# Patient Record
Sex: FEMALE | Race: WHITE | Employment: OTHER | URBAN - METROPOLITAN AREA
[De-identification: names, ages, dates, MRNs, and addresses within clinical notes are randomized per-mention and may not be internally consistent; named-entity substitution may affect disease eponyms.]

---

## 2018-08-10 PROBLEM — I35.0 NONRHEUMATIC AORTIC VALVE STENOSIS: Status: ACTIVE | Noted: 2018-08-10

## 2018-08-10 PROBLEM — I50.32 CHRONIC DIASTOLIC CONGESTIVE HEART FAILURE (HCC): Status: ACTIVE | Noted: 2018-08-10

## 2018-08-10 PROBLEM — I10 HTN (HYPERTENSION), BENIGN: Status: ACTIVE | Noted: 2018-08-10

## 2018-08-10 PROBLEM — E11.51 TYPE 2 DIABETES MELLITUS WITH DIABETIC PERIPHERAL ANGIOPATHY WITHOUT GANGRENE, WITHOUT LONG-TERM CURRENT USE OF INSULIN (HCC): Status: ACTIVE | Noted: 2018-08-10

## 2018-08-10 PROBLEM — E78.2 MIXED HYPERLIPIDEMIA: Status: ACTIVE | Noted: 2018-08-10

## 2018-09-24 ENCOUNTER — HOSPITAL ENCOUNTER (OUTPATIENT)
Dept: CT IMAGING | Facility: HOSPITAL | Age: 83
Discharge: HOME | End: 2018-09-24
Attending: NURSE PRACTITIONER
Payer: MEDICARE

## 2018-09-24 ENCOUNTER — HOSPITAL ENCOUNTER (OUTPATIENT)
Dept: ULTRASOUND IMAGING | Facility: HOSPITAL | Age: 83
Discharge: HOME | End: 2018-09-24
Attending: NURSE PRACTITIONER
Payer: MEDICARE

## 2018-09-24 ENCOUNTER — APPOINTMENT (OUTPATIENT)
Dept: RESPIRATORY THERAPY | Facility: HOSPITAL | Age: 83
End: 2018-09-24
Attending: NURSE PRACTITIONER
Payer: MEDICARE

## 2018-09-24 DIAGNOSIS — I35.0 SEVERE AORTIC STENOSIS: ICD-10-CM

## 2018-09-24 DIAGNOSIS — R09.89 OTHER SPECIFIED SYMPTOMS AND SIGNS INVOLVING THE CIRCULATORY AND RESPIRATORY SYSTEMS: ICD-10-CM

## 2018-09-24 PROCEDURE — 71275 CT ANGIOGRAPHY CHEST: CPT | Performed by: NURSE PRACTITIONER

## 2018-09-24 PROCEDURE — 75635 CT ANGIO ABDOMINAL ARTERIES: CPT | Performed by: NURSE PRACTITIONER

## 2018-09-24 PROCEDURE — 93880 EXTRACRANIAL BILAT STUDY: CPT | Performed by: NURSE PRACTITIONER

## 2018-09-24 PROCEDURE — 99212 OFFICE O/P EST SF 10 MIN: CPT | Performed by: INTERNAL MEDICINE

## 2018-09-24 PROCEDURE — 94618 PULMONARY STRESS TESTING: CPT | Performed by: INTERNAL MEDICINE

## 2018-09-25 NOTE — PROCEDURES
Spirometry should be interpreted with caution since the patient was not able to perform forced maneuvers correctly . ATS reproducibility criteria were not met. This lessens the reliability of the results which reflect the patient's best effort.     Spiromet

## 2018-09-25 NOTE — CONSULTS
Cardiothoracic Surgery  TAVR Consult     TAVR Office Consult  9/24/18  Referring: Dr Fay He  PCP: Dr Elvira Callahan  Reason for consult: severe symptomatic aortic stenosis  80year old with symptomatic aortic stenosis with increased SOB, TOBIAS, and fatigue  PMH: H cardiac cath was reviewed  Labs were reviewed and pre-op labs have been ordered  CXR and CT of chest will be reviewed   Pt was seen and examined by me today and after reviewing the chart and talking with the patient   the assessment after discussion with MARY

## 2018-10-15 ENCOUNTER — APPOINTMENT (OUTPATIENT)
Dept: GENERAL RADIOLOGY | Facility: HOSPITAL | Age: 83
DRG: 266 | End: 2018-10-15
Attending: NURSE PRACTITIONER
Payer: MEDICARE

## 2018-10-15 ENCOUNTER — HOSPITAL ENCOUNTER (INPATIENT)
Facility: HOSPITAL | Age: 83
LOS: 2 days | Discharge: HOME | DRG: 266 | End: 2018-10-17
Attending: INTERNAL MEDICINE | Admitting: INTERNAL MEDICINE
Payer: MEDICARE

## 2018-10-15 ENCOUNTER — ANESTHESIA EVENT (OUTPATIENT)
Dept: CARDIAC SURGERY | Facility: HOSPITAL | Age: 83
End: 2018-10-15

## 2018-10-15 PROCEDURE — 86920 COMPATIBILITY TEST SPIN: CPT

## 2018-10-15 PROCEDURE — 82962 GLUCOSE BLOOD TEST: CPT

## 2018-10-15 PROCEDURE — 85025 COMPLETE CBC W/AUTO DIFF WBC: CPT | Performed by: NURSE PRACTITIONER

## 2018-10-15 PROCEDURE — 85610 PROTHROMBIN TIME: CPT | Performed by: NURSE PRACTITIONER

## 2018-10-15 PROCEDURE — 76937 US GUIDE VASCULAR ACCESS: CPT

## 2018-10-15 PROCEDURE — 83036 HEMOGLOBIN GLYCOSYLATED A1C: CPT | Performed by: PHYSICIAN ASSISTANT

## 2018-10-15 PROCEDURE — 86901 BLOOD TYPING SEROLOGIC RH(D): CPT | Performed by: NURSE PRACTITIONER

## 2018-10-15 PROCEDURE — 80053 COMPREHEN METABOLIC PANEL: CPT | Performed by: PHYSICIAN ASSISTANT

## 2018-10-15 PROCEDURE — 36592 COLLECT BLOOD FROM PICC: CPT

## 2018-10-15 PROCEDURE — 86850 RBC ANTIBODY SCREEN: CPT | Performed by: NURSE PRACTITIONER

## 2018-10-15 PROCEDURE — 85027 COMPLETE CBC AUTOMATED: CPT | Performed by: PHYSICIAN ASSISTANT

## 2018-10-15 PROCEDURE — 71045 X-RAY EXAM CHEST 1 VIEW: CPT | Performed by: NURSE PRACTITIONER

## 2018-10-15 PROCEDURE — 83880 ASSAY OF NATRIURETIC PEPTIDE: CPT | Performed by: NURSE PRACTITIONER

## 2018-10-15 PROCEDURE — 87081 CULTURE SCREEN ONLY: CPT | Performed by: NURSE PRACTITIONER

## 2018-10-15 PROCEDURE — 86900 BLOOD TYPING SEROLOGIC ABO: CPT | Performed by: NURSE PRACTITIONER

## 2018-10-15 RX ORDER — ALBUTEROL SULFATE 90 UG/1
2 AEROSOL, METERED RESPIRATORY (INHALATION) EVERY 6 HOURS PRN
Status: DISCONTINUED | OUTPATIENT
Start: 2018-10-15 | End: 2018-10-17

## 2018-10-15 RX ORDER — ONDANSETRON 2 MG/ML
4 INJECTION INTRAMUSCULAR; INTRAVENOUS EVERY 6 HOURS PRN
Status: DISCONTINUED | OUTPATIENT
Start: 2018-10-15 | End: 2018-10-17

## 2018-10-15 RX ORDER — ONDANSETRON 4 MG/1
4 TABLET, ORALLY DISINTEGRATING ORAL EVERY 6 HOURS PRN
Status: DISCONTINUED | OUTPATIENT
Start: 2018-10-15 | End: 2018-10-17

## 2018-10-15 RX ORDER — SODIUM CHLORIDE 9 MG/ML
INJECTION, SOLUTION INTRAVENOUS CONTINUOUS
Status: DISCONTINUED | OUTPATIENT
Start: 2018-10-16 | End: 2018-10-17

## 2018-10-15 RX ORDER — CHLORHEXIDINE GLUCONATE 4 G/100ML
30 SOLUTION TOPICAL ONCE
Status: COMPLETED | OUTPATIENT
Start: 2018-10-15 | End: 2018-10-15

## 2018-10-15 RX ORDER — ASPIRIN 81 MG/1
81 TABLET, CHEWABLE ORAL DAILY
Status: DISCONTINUED | OUTPATIENT
Start: 2018-10-15 | End: 2018-10-17

## 2018-10-15 RX ORDER — ROSUVASTATIN CALCIUM 5 MG/1
5 TABLET, COATED ORAL NIGHTLY
Status: DISCONTINUED | OUTPATIENT
Start: 2018-10-15 | End: 2018-10-17

## 2018-10-15 RX ORDER — DILTIAZEM HYDROCHLORIDE 240 MG/1
240 CAPSULE, COATED, EXTENDED RELEASE ORAL DAILY
Status: DISCONTINUED | OUTPATIENT
Start: 2018-10-15 | End: 2018-10-17

## 2018-10-15 NOTE — H&P
[facility] Hospitalist History and Physical      CC: direct admission for TAVR     PCP: Jose Knott MD      History of Present Illness: Patient is a 80year old female with PMH sig for severe AS, chronic diastolic heart failure, DM, HL, HTN who presented to [address] [place] [address] LABS:   Lab Results   Component Value Date    WBC 7.9 10/15/2018    WBC 7.9 10/15/2018    HGB 10.6 10/15/2018    HGB 10.6 10/15/2018    HCT 32.7 10/15/2018    HCT 32.7 10/15/2018    .0 10/15/2018    .0 10/15/2018    CREATSERUM 0.98 10/15/20

## 2018-10-15 NOTE — ANESTHESIA PREPROCEDURE EVALUATION
PRE-OP EVALUATION    Patient Name: Manny Rosa    Pre-op Diagnosis: aortic stenosis    Procedure(s):  transcatheter aortic valve replacement, 23mm Melissa, right femoral approach    Surgeon(s) and Role:     * Darinel Ibrahim MD - Primary    Pre-op vitals Cardiovascular                  (+) hypertension             (+) valvular problems/murmurs and AS       (+) CHF                Endo/Other      (+) diabetes                            Pulmonary        (+) COPD                   Neuro/Psych 32.4 10/15/2018    MCHC 32.4 10/15/2018    MCHC 31.3 09/18/2018    RDW 14.2 10/15/2018    RDW 14.2 10/15/2018    RDW 14.6 09/18/2018    .0 10/15/2018    .0 10/15/2018     09/18/2018     Lab Results   Component Value Date     10/1

## 2018-10-15 NOTE — OPERATIVE REPORT
Cardiovascular Surgery Operative Note  TAVR        INDICATIONS:         80year old with symptomatic aortic stenosis  Pt seen in TAVR clinic with Tenzin Campoverde and Allan  STS Risk Score: 6.9 %     The assessment after discussion with Dr Jing Campoverde is that the

## 2018-10-15 NOTE — PROGRESS NOTES
[address] [facility] Cardiology  Progress Note    Curlene Michael Patient Status:  Inpatient    1925 MRN [id number]   [facility] 2NE-A Attending Arthur Koyanagi, MD   Hosp Day # 0 PCP Eber Bear MD     The patient was seen and examined. and daughter-in-law since being discharged in July 2018.       Uses a walker at home.      The patient is tolerating medications with no reported side effects.        HISTORY:    Past Medical History   Past Medical History:  No date:  Aortic stenosis      C and respiratory systems as above. No nausea/vomiting or significant abdominal complaints. No neurological complaints. No fevers or chills.  A ten point review of systems has been performed and is otherwise negative, except as described above.     PHYSICAL EX doppler. Dr. Nneka Martin     CATH 8/29/18 [address]:  Hemodynamics:   RA: peak of 2, RV peak of 47. PA pressure 52/17 w/ mean of 30. PCWP wa na A wave of 21, C of 22 , mean of 16. Calculated pO2 sat on room air 58%.   Calculated aorta sat Spirometry  · Discussed TAVR procedure at length--over 30 minutes of education  · We discussed the  benefits and risks including heart attack, stroke, open heart surgery, need for a pacemaker, bleeding, and death.  All questions answered.      Chronic diast

## 2018-10-16 ENCOUNTER — APPOINTMENT (OUTPATIENT)
Dept: CV DIAGNOSTICS | Facility: HOSPITAL | Age: 83
DRG: 266 | End: 2018-10-16
Attending: INTERNAL MEDICINE
Payer: MEDICARE

## 2018-10-16 ENCOUNTER — ANESTHESIA (OUTPATIENT)
Dept: CARDIAC SURGERY | Facility: HOSPITAL | Age: 83
End: 2018-10-16

## 2018-10-16 ENCOUNTER — APPOINTMENT (OUTPATIENT)
Dept: GENERAL RADIOLOGY | Facility: HOSPITAL | Age: 83
DRG: 266 | End: 2018-10-16
Attending: INTERNAL MEDICINE
Payer: MEDICARE

## 2018-10-16 ENCOUNTER — TELEPHONE (OUTPATIENT)
Dept: INTERVENTIONAL RADIOLOGY/VASCULAR | Facility: HOSPITAL | Age: 83
End: 2018-10-16

## 2018-10-16 ENCOUNTER — APPOINTMENT (OUTPATIENT)
Dept: CV DIAGNOSTICS | Facility: HOSPITAL | Age: 83
DRG: 266 | End: 2018-10-16
Attending: NURSE PRACTITIONER
Payer: MEDICARE

## 2018-10-16 PROCEDURE — 82962 GLUCOSE BLOOD TEST: CPT

## 2018-10-16 PROCEDURE — 85014 HEMATOCRIT: CPT

## 2018-10-16 PROCEDURE — 85347 COAGULATION TIME ACTIVATED: CPT

## 2018-10-16 PROCEDURE — 82330 ASSAY OF CALCIUM: CPT

## 2018-10-16 PROCEDURE — 93010 ELECTROCARDIOGRAM REPORT: CPT | Performed by: INTERNAL MEDICINE

## 2018-10-16 PROCEDURE — 0JH606Z INSERTION OF PACEMAKER, DUAL CHAMBER INTO CHEST SUBCUTANEOUS TISSUE AND FASCIA, OPEN APPROACH: ICD-10-PCS | Performed by: INTERNAL MEDICINE

## 2018-10-16 PROCEDURE — 80048 BASIC METABOLIC PNL TOTAL CA: CPT | Performed by: PHYSICIAN ASSISTANT

## 2018-10-16 PROCEDURE — B517YZA FLUOROSCOPY OF LEFT SUBCLAVIAN VEIN USING OTHER CONTRAST, GUIDANCE: ICD-10-PCS | Performed by: INTERNAL MEDICINE

## 2018-10-16 PROCEDURE — 85027 COMPLETE CBC AUTOMATED: CPT | Performed by: PHYSICIAN ASSISTANT

## 2018-10-16 PROCEDURE — 93355 ECHO TRANSESOPHAGEAL (TEE): CPT | Performed by: NURSE PRACTITIONER

## 2018-10-16 PROCEDURE — 93306 TTE W/DOPPLER COMPLETE: CPT | Performed by: INTERNAL MEDICINE

## 2018-10-16 PROCEDURE — B310YZZ FLUOROSCOPY OF THORACIC AORTA USING OTHER CONTRAST: ICD-10-PCS | Performed by: INTERNAL MEDICINE

## 2018-10-16 PROCEDURE — B24BZZ4 ULTRASONOGRAPHY OF HEART WITH AORTA, TRANSESOPHAGEAL: ICD-10-PCS | Performed by: INTERNAL MEDICINE

## 2018-10-16 PROCEDURE — 85027 COMPLETE CBC AUTOMATED: CPT | Performed by: NURSE PRACTITIONER

## 2018-10-16 PROCEDURE — 93005 ELECTROCARDIOGRAM TRACING: CPT

## 2018-10-16 PROCEDURE — 02HK3JZ INSERTION OF PACEMAKER LEAD INTO RIGHT VENTRICLE, PERCUTANEOUS APPROACH: ICD-10-PCS | Performed by: INTERNAL MEDICINE

## 2018-10-16 PROCEDURE — 82803 BLOOD GASES ANY COMBINATION: CPT

## 2018-10-16 PROCEDURE — 84295 ASSAY OF SERUM SODIUM: CPT

## 2018-10-16 PROCEDURE — 02H63JZ INSERTION OF PACEMAKER LEAD INTO RIGHT ATRIUM, PERCUTANEOUS APPROACH: ICD-10-PCS | Performed by: INTERNAL MEDICINE

## 2018-10-16 PROCEDURE — 84132 ASSAY OF SERUM POTASSIUM: CPT

## 2018-10-16 PROCEDURE — 02RF38Z REPLACEMENT OF AORTIC VALVE WITH ZOOPLASTIC TISSUE, PERCUTANEOUS APPROACH: ICD-10-PCS | Performed by: INTERNAL MEDICINE

## 2018-10-16 PROCEDURE — 80053 COMPREHEN METABOLIC PANEL: CPT | Performed by: PHYSICIAN ASSISTANT

## 2018-10-16 PROCEDURE — 85610 PROTHROMBIN TIME: CPT | Performed by: NURSE PRACTITIONER

## 2018-10-16 PROCEDURE — 36592 COLLECT BLOOD FROM PICC: CPT

## 2018-10-16 PROCEDURE — 71045 X-RAY EXAM CHEST 1 VIEW: CPT | Performed by: INTERNAL MEDICINE

## 2018-10-16 DEVICE — VALVE SAPIEN 23MM COMMANDER: Type: IMPLANTABLE DEVICE | Site: HEART | Status: FUNCTIONAL

## 2018-10-16 RX ORDER — LACTULOSE 20 G/30ML
20 SOLUTION ORAL DAILY PRN
Status: DISCONTINUED | OUTPATIENT
Start: 2018-10-16 | End: 2018-10-17

## 2018-10-16 RX ORDER — DOCUSATE SODIUM 100 MG/1
100 CAPSULE, LIQUID FILLED ORAL 2 TIMES DAILY
Status: DISCONTINUED | OUTPATIENT
Start: 2018-10-16 | End: 2018-10-17

## 2018-10-16 RX ORDER — CEFAZOLIN SODIUM/WATER 2 G/20 ML
2 SYRINGE (ML) INTRAVENOUS EVERY 12 HOURS
Status: COMPLETED | OUTPATIENT
Start: 2018-10-16 | End: 2018-10-17

## 2018-10-16 RX ORDER — HYDROCODONE BITARTRATE AND ACETAMINOPHEN 10; 325 MG/1; MG/1
2 TABLET ORAL EVERY 4 HOURS PRN
Status: DISCONTINUED | OUTPATIENT
Start: 2018-10-16 | End: 2018-10-17

## 2018-10-16 RX ORDER — MORPHINE SULFATE 4 MG/ML
8 INJECTION, SOLUTION INTRAMUSCULAR; INTRAVENOUS
Status: DISCONTINUED | OUTPATIENT
Start: 2018-10-16 | End: 2018-10-17

## 2018-10-16 RX ORDER — POLYETHYLENE GLYCOL 3350 17 G/17G
1 POWDER, FOR SOLUTION ORAL DAILY PRN
Status: DISCONTINUED | OUTPATIENT
Start: 2018-10-16 | End: 2018-10-17

## 2018-10-16 RX ORDER — ONDANSETRON 2 MG/ML
4 INJECTION INTRAMUSCULAR; INTRAVENOUS EVERY 6 HOURS PRN
Status: DISCONTINUED | OUTPATIENT
Start: 2018-10-16 | End: 2018-10-17

## 2018-10-16 RX ORDER — FAMOTIDINE 20 MG/1
20 TABLET ORAL EVERY 24 HOURS
Status: DISCONTINUED | OUTPATIENT
Start: 2018-10-16 | End: 2018-10-17

## 2018-10-16 RX ORDER — DOBUTAMINE HYDROCHLORIDE 200 MG/100ML
INJECTION INTRAVENOUS CONTINUOUS PRN
Status: DISCONTINUED | OUTPATIENT
Start: 2018-10-16 | End: 2018-10-17

## 2018-10-16 RX ORDER — FAMOTIDINE 20 MG/1
20 TABLET ORAL 2 TIMES DAILY
Status: DISCONTINUED | OUTPATIENT
Start: 2018-10-16 | End: 2018-10-16

## 2018-10-16 RX ORDER — MORPHINE SULFATE 4 MG/ML
2 INJECTION, SOLUTION INTRAMUSCULAR; INTRAVENOUS
Status: DISCONTINUED | OUTPATIENT
Start: 2018-10-16 | End: 2018-10-17

## 2018-10-16 RX ORDER — ACETAMINOPHEN 325 MG/1
650 TABLET ORAL EVERY 4 HOURS PRN
Status: DISCONTINUED | OUTPATIENT
Start: 2018-10-16 | End: 2018-10-17

## 2018-10-16 RX ORDER — BISACODYL 10 MG
10 SUPPOSITORY, RECTAL RECTAL
Status: DISCONTINUED | OUTPATIENT
Start: 2018-10-16 | End: 2018-10-17

## 2018-10-16 RX ORDER — ALBUMIN, HUMAN INJ 5% 5 %
250 SOLUTION INTRAVENOUS ONCE AS NEEDED
Status: ACTIVE | OUTPATIENT
Start: 2018-10-16 | End: 2018-10-16

## 2018-10-16 RX ORDER — HYDRALAZINE HYDROCHLORIDE 20 MG/ML
10 INJECTION INTRAMUSCULAR; INTRAVENOUS
Status: DISCONTINUED | OUTPATIENT
Start: 2018-10-16 | End: 2018-10-17

## 2018-10-16 RX ORDER — CEFAZOLIN SODIUM/WATER 2 G/20 ML
SYRINGE (ML) INTRAVENOUS
Status: DISCONTINUED | OUTPATIENT
Start: 2018-10-16 | End: 2018-10-16 | Stop reason: HOSPADM

## 2018-10-16 RX ORDER — NITROGLYCERIN 20 MG/100ML
INJECTION INTRAVENOUS CONTINUOUS PRN
Status: DISCONTINUED | OUTPATIENT
Start: 2018-10-16 | End: 2018-10-17

## 2018-10-16 RX ORDER — HYDROCODONE BITARTRATE AND ACETAMINOPHEN 10; 325 MG/1; MG/1
1 TABLET ORAL EVERY 4 HOURS PRN
Status: DISCONTINUED | OUTPATIENT
Start: 2018-10-16 | End: 2018-10-17

## 2018-10-16 RX ORDER — FAMOTIDINE 10 MG/ML
20 INJECTION, SOLUTION INTRAVENOUS EVERY 24 HOURS
Status: DISCONTINUED | OUTPATIENT
Start: 2018-10-16 | End: 2018-10-17

## 2018-10-16 RX ORDER — MORPHINE SULFATE 4 MG/ML
4 INJECTION, SOLUTION INTRAMUSCULAR; INTRAVENOUS
Status: DISCONTINUED | OUTPATIENT
Start: 2018-10-16 | End: 2018-10-17

## 2018-10-16 RX ORDER — SODIUM CHLORIDE 9 MG/ML
INJECTION, SOLUTION INTRAVENOUS CONTINUOUS
Status: DISCONTINUED | OUTPATIENT
Start: 2018-10-16 | End: 2018-10-17

## 2018-10-16 RX ORDER — FAMOTIDINE 10 MG/ML
20 INJECTION, SOLUTION INTRAVENOUS 2 TIMES DAILY
Status: DISCONTINUED | OUTPATIENT
Start: 2018-10-16 | End: 2018-10-16

## 2018-10-16 NOTE — PROCEDURES
PROCEDURE PERFORMED: Transcatheter aortic valve replacement, 23 mm Leyva Melissa 3 aortic valve, right  transfemoral approach percutaneously. OPERATORS:   1. Cardiovascular surgeon, Dr. Sheri Mckeon.   2. Interventional Cardiology, Dr. Caitlin Wolff

## 2018-10-16 NOTE — PLAN OF CARE
SCr 0.93 mg/dL; estimated CrCl 27.1 mL/min. Post-op Ancef adjusted to 2 grams IV every 12 hours x 2 doses per protocol.     Yaneth Herman, PharmD  10/16/18 12:29 PM

## 2018-10-16 NOTE — ANESTHESIA POSTPROCEDURE EVALUATION
[facility] Patient Status:  Inpatient   Age/Gender 80year old female MRN [id number]   [facility] 6NE-A Attending Magdalena Kaiser MD   Hosp Day # 1 PCP Marlene Arriola MD       Anesthesia Post-op Note    Procedure(s):  trans

## 2018-10-16 NOTE — PROGRESS NOTES
[facility] Hospitalist Progress Note                                                                   Alia Goodson [address]  7/30/1925    CC: f/u severe AS s/p TAVR    SUBJECTIVE:    Pt seen in CCU post • Nitroglycerin in D5W     • nitroprusside (NIPRIDE) 50 mg in D5W infusion     • sodium chloride 50 mL/hr at 10/16/18 0200     PRN: HYDROcodone-acetaminophen **OR** HYDROcodone-acetaminophen, Albumin Human, DOBUTamine, norepinephrine, EPINEPHrine (ADRE

## 2018-10-16 NOTE — PROCEDURES
[facility] [address] [place] [address] Cardiology  Transesophageal Echocardiogram Report    PREOPERATIVE DIAGNOSIS:   Severe aortic stenosis    POSTOPERATIVE DIAGNOSIS:  Transcatheter aortic valve replacement    PROCEDURE PERFORMED: Transesophageal echocardiogram with Doppl gradient 38 mmHg. Postprocedure, normally functioning Melissa 3 bioprosthetic valve with a mean gradient of 3 mmHg. Trace paravalvular aortic regurgitation (by VARC-2 criteria) was noted.     Clemencia Ryder MD, [facility]  10/16/2018  12:16 PM

## 2018-10-16 NOTE — PROGRESS NOTES
[facility] Pharm[facility] Note: Renal dose adjustment for Famotidine (Pepcid)  Osito Rousseau has been prescribed Famotidine (Pepcid) 20 mg every 12 hours. Estimated Creatinine Clearance: 27.1 mL/min (based on SCr of 0.93 mg/dL).     Her calculated creatinine

## 2018-10-17 ENCOUNTER — APPOINTMENT (OUTPATIENT)
Dept: GENERAL RADIOLOGY | Facility: HOSPITAL | Age: 83
DRG: 266 | End: 2018-10-17
Attending: NURSE PRACTITIONER
Payer: MEDICARE

## 2018-10-17 VITALS
SYSTOLIC BLOOD PRESSURE: 124 MMHG | DIASTOLIC BLOOD PRESSURE: 48 MMHG | HEART RATE: 70 BPM | BODY MASS INDEX: 28 KG/M2 | OXYGEN SATURATION: 97 % | RESPIRATION RATE: 22 BRPM | TEMPERATURE: 97 F | WEIGHT: 142.44 LBS

## 2018-10-17 PROBLEM — Z95.0 PACEMAKER: Chronic | Status: ACTIVE | Noted: 2018-10-16

## 2018-10-17 PROBLEM — Z95.2 S/P TAVR (TRANSCATHETER AORTIC VALVE REPLACEMENT): Status: ACTIVE | Noted: 2018-10-17

## 2018-10-17 PROCEDURE — 85027 COMPLETE CBC AUTOMATED: CPT | Performed by: NURSE PRACTITIONER

## 2018-10-17 PROCEDURE — 93010 ELECTROCARDIOGRAM REPORT: CPT | Performed by: INTERNAL MEDICINE

## 2018-10-17 PROCEDURE — 80053 COMPREHEN METABOLIC PANEL: CPT | Performed by: NURSE PRACTITIONER

## 2018-10-17 PROCEDURE — 93005 ELECTROCARDIOGRAM TRACING: CPT

## 2018-10-17 PROCEDURE — 71046 X-RAY EXAM CHEST 2 VIEWS: CPT | Performed by: NURSE PRACTITIONER

## 2018-10-17 PROCEDURE — 82962 GLUCOSE BLOOD TEST: CPT

## 2018-10-17 PROCEDURE — 85610 PROTHROMBIN TIME: CPT | Performed by: NURSE PRACTITIONER

## 2018-10-17 RX ORDER — AMOXICILLIN 500 MG/1
2000 CAPSULE ORAL
Qty: 4 CAPSULE | Refills: 4 | Status: SHIPPED | OUTPATIENT
Start: 2018-10-17 | End: 2018-10-27

## 2018-10-17 RX ORDER — ACETAMINOPHEN 325 MG/1
650 TABLET ORAL EVERY 4 HOURS PRN
Qty: 30 TABLET | Refills: 0 | Status: SHIPPED | COMMUNITY
Start: 2018-10-17 | End: 2021-04-08

## 2018-10-17 NOTE — PROGRESS NOTES
[facility] Cardiology  Progress Note    Delma Alexis Patient Status:  Inpatient    1925 MRN [id number]   [facility] 6NE-A Attending Georgia Grant MD   Hosp Day # 2 PCP Yoni Jasmine MD     Impression:  1.  Severe AS s/p TAVR 0.5-30 mcg/min Intravenous Continuous PRN   EPINEPHrine HCl (ADRENALIN) 4 mg in sodium chloride 0.9% 250 mL infusion 1-10 mcg/min Intravenous Continuous PRN   nitroGLYCERIN infusion 50mg in D5W 250ml 5-300 mcg/min Intravenous Continuous PRN   Nitroprusside Nightly       Laboratory Data:  Lab Results   Component Value Date    WBC 7.8 10/17/2018    HGB 8.8 10/17/2018    HCT 28.3 10/17/2018    .0 10/17/2018     Lab Results   Component Value Date    INR 1.10 10/17/2018    INR 1.16 (H) 10/16/2018    INR 1.

## 2018-10-17 NOTE — OPERATIVE REPORT
[facility]    PATIENT'S NAME: Rony Gregorio   ATTENDING PHYSICIAN: Giancarlo Parikh MD   OPERATING PHYSICIAN: John Ford M.D.    PATIENT ACCOUNT#:   [de-identified]    LOCATION:  [address]  MEDICAL RECORD #:   [id number]       DATE OF BIRTH: aortic valve 23 mm diameter with trivial perivalvular leak postprocedure. 2.   Installation of permanent pacemaker by EP. 3.   The patient tolerated the procedure without complications.     Dictated By Sathya Huynh M.D.  d: 10/16/2018 10:46:42  t: 8

## 2018-10-17 NOTE — CM/SW NOTE
10/17/18 1200   CM/SW Referral Data   Referral Source Physician   Reason for Referral Discharge planning;Protocol order set   Specify order set TAVR   Informant Patient; Children   Patient Info   Patient's Mental Status Alert;Oriented   Patient's Home En

## 2018-10-17 NOTE — DISCHARGE SUMMARY
General Medicine Discharge Summary     Patient ID:  Meejhonny Mcmullen  80year old  7/30/1925    Admit date: 10/15/2018    Discharge date and time:  10/17/18    Attending Physician: Shree Soares, instructions:      Current Discharge Medication List    START taking these medications    acetaminophen 325 MG Oral Tab  Take 2 tablets (650 mg total) by mouth every 4 (four) hours as needed.     amoxicillin 500 MG Oral Cap  Take 4 capsules (2,000 mg total)

## 2018-10-17 NOTE — PLAN OF CARE
Pt received alert, oriented to name and situation, reoriented to place and time. Pt forgetful, pleasantly confused. Pt BOWMAN, following all commands. Pt denies any pain or SOB. Right groin soft, no hematoma, bruising is present.  Left groin soft, no hematome,

## 2018-10-17 NOTE — PROCEDURES
[facility]    PATIENT'S NAME: Marquita Holden   ATTENDING PHYSICIAN: Lizbeth Domingo MD   OPERATING PHYSICIAN: La Ceballos M.D.    PATIENT ACCOUNT#:   [de-identified]    LOCATION:  [address] [place] [address]  MEDICAL RECORD #:   [id number]       DATE OF BIRTH:  [birth year]/ sutured in place. The leads were tested through the device. P waves were 1.2. Pacing impedance 494. RV waves were not measurable, and pacing impedance was also 494. Pacing threshold in both chambers was 0.75 V at 0.4 msec.     The temporary pacemaker w

## 2018-10-17 NOTE — PROGRESS NOTES
[facility] Cardiology  Progress Note    Balbina Duran Patient Status:  Inpatient    1925 MRN [id number]   [facility] 6NE-A Attending Salas Alvarez MD   Hosp Day # 2 PCP Estefany Saldana MD     Impression:  1.  Severe AS s/p TAVR 0.5-30 mcg/min Intravenous Continuous PRN   EPINEPHrine HCl (ADRENALIN) 4 mg in sodium chloride 0.9% 250 mL infusion 1-10 mcg/min Intravenous Continuous PRN   nitroGLYCERIN infusion 50mg in D5W 250ml 5-300 mcg/min Intravenous Continuous PRN   Nitroprusside Nightly       Laboratory Data:  Lab Results   Component Value Date    WBC 7.8 10/17/2018    HGB 8.8 10/17/2018    HCT 28.3 10/17/2018    .0 10/17/2018     Lab Results   Component Value Date    INR 1.10 10/17/2018    INR 1.16 (H) 10/16/2018    INR 1.

## 2018-11-14 PROBLEM — R94.30 CARDIAC LV EJECTION FRACTION >40%: Status: ACTIVE | Noted: 2018-11-14

## 2018-11-14 PROBLEM — I35.0 SEVERE AORTIC STENOSIS: Status: RESOLVED | Noted: 2018-08-10 | Resolved: 2018-11-14

## (undated) DEVICE — SYRINGE 30ML LL TIP

## (undated) DEVICE — SPONGE LAP 18X18 XRAY STRL

## (undated) DEVICE — 3M™ BAIR HUGGER® CARDIAC ACCESS BLANKET, 5 PER CASE 63000: Brand: BAIR HUGGER™

## (undated) DEVICE — 1010 S-DRAPE TOWEL DRAPE 10/BX: Brand: STERI-DRAPE™

## (undated) DEVICE — CSTM UNIVERSAL DRAPE PK: Brand: MEDLINE INDUSTRIES, INC.

## (undated) DEVICE — SOL LACT RINGERS 1000ML

## (undated) DEVICE — TRANSPOSAL ULTRAFLEX DUO/QUAD ULTRA CART MANIFOLD

## (undated) DEVICE — CELL SAVER BAG 600ML 4R2023

## (undated) DEVICE — GAUZE SPONGES,12 PLY: Brand: CURITY

## (undated) DEVICE — SPONGE RAYTEC 4X4 RF DETECT

## (undated) DEVICE — STERILE POLYISOPRENE POWDER-FREE SURGICAL GLOVES: Brand: PROTEXIS

## (undated) DEVICE — CELL SAVER RESERVOIR BRAT

## (undated) DEVICE — CELL SAVER 5/5+ BOWL KIT-225ML: Brand: HAEMONETICS CELL SAVER 5/5+ SYSTEMS

## (undated) DEVICE — GLOVE SURG TRIUMPH SZ 8

## (undated) NOTE — LETTER
[facility] [address], [address] [place] [address]    Consent for Anesthesia   1.    Mary Lou Speaks agree to be cared for by an anesthesiologist, who is specially trained to monitor me and give me medicine to put me to sleep or keep me comfortab vision, nerves, or muscles and in extremely rare instances death. 5. My doctor has explained to me other choices available to me for my care (alternatives).   6. Pregnant Patients (“epidural”):  I understand that the risks of having an epidural (medicine g

## (undated) NOTE — LETTER
[address] [facility] FOR BLOOD OR BLOOD COMPONENTS      In the course of your treatment, it may become necessary to administer a transfusion of blood or blood components.  This form provides basic information concerning this proc explain the alternatives to you if it has not already been done. I,Farshad Staples, have read/had read to me the above. I understand the matters bearing on the decision whether or not to authorize a transfusion of blood or blood components.  I have no quest

## (undated) NOTE — LETTER
Lizette Hilliard [address] [address]  [place], [address] [facility]    Consent for Operation  Date: __________________                                Time: _______________    1.  I authorize the performance upon Chiquita Rodriguez the following operation:  Procedure( revealed by the pictures or by descriptive texts accompanying them. If the procedure has been videotaped, the surgeon will obtain the original videotape. The hospital will not be responsible for storage or maintenance of this tape.   6. For the purpose of a THAT MY DOCTOR PROVIDED ME WITH THE ABOVE EXPLANATIONS, THAT ALL BLANKS OR STATEMENTS REQUIRING INSERTION OR COMPLETION WERE FILLED IN.     Signature of Patient:   ___________________________    When the patient is a minor or mentally incompetent to give co iii. All of the medicines I take (including prescriptions, herbal supplements, and pills I can buy without a prescription (including street drugs/illegal medications).  Failure to inform my anesthesiologist about these medicines may increase my risk of anes _____________________________________________________________________________  Anesthesiologist Signature     Date   Time  I have discussed the procedure and information above with the patient (or patient’s representative) and answered their questions.  The

## (undated) NOTE — LETTER
Adelaide Brar M.D., F.A.C.S. Carmelo Zambrano M.D., F.A.C.S. Yonas Evans M.D., Claudia Paul. JUVE Jensen M.D., F.A.C.S. Sharmaine Gitelman. Kristal Fuller M.D., F.A.C.S. HAILY Israel M. Sherrlyn Maple A.D. Goble Sanfilippo, M.D., F. chance to have all of your questions and concerns answered. If there are any issues which have not been adequately addressed, we ask you to bring them forward so that we can thoroughly address them.     A patient who is fully informed and understands their treatment, among other options and the risks and benefits of the different treatment options:    Yes _____ No _____    A [facility] surgeon as explained to me that if I should so desire, he/she is willing to explain my case and the surgical and non-surgical optio